# Patient Record
Sex: MALE | Race: BLACK OR AFRICAN AMERICAN | Employment: UNEMPLOYED | URBAN - METROPOLITAN AREA
[De-identification: names, ages, dates, MRNs, and addresses within clinical notes are randomized per-mention and may not be internally consistent; named-entity substitution may affect disease eponyms.]

---

## 2017-01-10 RX ORDER — OXYCODONE HYDROCHLORIDE AND ACETAMINOPHEN 5; 325 MG/1; MG/1
1 TABLET ORAL EVERY 6 HOURS PRN
Qty: 40 TABLET | Refills: 0 | Status: SHIPPED | OUTPATIENT
Start: 2017-01-10 | End: 2017-01-20

## 2017-01-20 ENCOUNTER — OFFICE VISIT (OUTPATIENT)
Dept: FAMILY MEDICINE CLINIC | Age: 57
End: 2017-01-20

## 2017-01-20 VITALS
SYSTOLIC BLOOD PRESSURE: 130 MMHG | DIASTOLIC BLOOD PRESSURE: 80 MMHG | HEART RATE: 82 BPM | BODY MASS INDEX: 33.09 KG/M2 | RESPIRATION RATE: 18 BRPM | WEIGHT: 223.4 LBS | TEMPERATURE: 98 F | HEIGHT: 69 IN

## 2017-01-20 DIAGNOSIS — F51.01 PRIMARY INSOMNIA: ICD-10-CM

## 2017-01-20 DIAGNOSIS — N52.9 ERECTILE DYSFUNCTION, UNSPECIFIED ERECTILE DYSFUNCTION TYPE: ICD-10-CM

## 2017-01-20 DIAGNOSIS — I10 ESSENTIAL HYPERTENSION: Primary | ICD-10-CM

## 2017-01-20 DIAGNOSIS — R07.89 OTHER CHEST PAIN: ICD-10-CM

## 2017-01-20 DIAGNOSIS — K40.20 BILATERAL INGUINAL HERNIA WITHOUT OBSTRUCTION OR GANGRENE, RECURRENCE NOT SPECIFIED: ICD-10-CM

## 2017-01-20 DIAGNOSIS — M51.9 LUMBAR DISC DISEASE: ICD-10-CM

## 2017-01-20 DIAGNOSIS — M54.50 ACUTE BILATERAL LOW BACK PAIN WITHOUT SCIATICA: ICD-10-CM

## 2017-01-20 PROCEDURE — 99214 OFFICE O/P EST MOD 30 MIN: CPT | Performed by: FAMILY MEDICINE

## 2017-01-20 RX ORDER — ZOLPIDEM TARTRATE 10 MG/1
10 TABLET ORAL NIGHTLY PRN
Qty: 30 TABLET | Refills: 0 | Status: SHIPPED | OUTPATIENT
Start: 2017-01-20 | End: 2017-06-29 | Stop reason: SDUPTHER

## 2017-01-20 RX ORDER — OXYCODONE AND ACETAMINOPHEN 7.5; 325 MG/1; MG/1
1 TABLET ORAL EVERY 8 HOURS PRN
Qty: 30 TABLET | Refills: 0 | Status: SHIPPED | OUTPATIENT
Start: 2017-01-20 | End: 2017-01-31 | Stop reason: SDUPTHER

## 2017-01-31 ENCOUNTER — OFFICE VISIT (OUTPATIENT)
Dept: FAMILY MEDICINE CLINIC | Age: 57
End: 2017-01-31

## 2017-01-31 VITALS
TEMPERATURE: 98.7 F | HEART RATE: 70 BPM | BODY MASS INDEX: 33.18 KG/M2 | WEIGHT: 224 LBS | SYSTOLIC BLOOD PRESSURE: 122 MMHG | HEIGHT: 69 IN | DIASTOLIC BLOOD PRESSURE: 80 MMHG | RESPIRATION RATE: 16 BRPM

## 2017-01-31 DIAGNOSIS — I10 ESSENTIAL HYPERTENSION: Primary | ICD-10-CM

## 2017-01-31 DIAGNOSIS — M51.9 LUMBAR DISC DISEASE: ICD-10-CM

## 2017-01-31 DIAGNOSIS — N52.9 ERECTILE DYSFUNCTION, UNSPECIFIED ERECTILE DYSFUNCTION TYPE: ICD-10-CM

## 2017-01-31 DIAGNOSIS — F51.01 PRIMARY INSOMNIA: ICD-10-CM

## 2017-01-31 DIAGNOSIS — K40.20 BILATERAL INGUINAL HERNIA WITHOUT OBSTRUCTION OR GANGRENE, RECURRENCE NOT SPECIFIED: ICD-10-CM

## 2017-01-31 PROCEDURE — 99214 OFFICE O/P EST MOD 30 MIN: CPT | Performed by: FAMILY MEDICINE

## 2017-01-31 PROCEDURE — 96372 THER/PROPH/DIAG INJ SC/IM: CPT | Performed by: FAMILY MEDICINE

## 2017-01-31 RX ORDER — OXYCODONE AND ACETAMINOPHEN 7.5; 325 MG/1; MG/1
1 TABLET ORAL EVERY 8 HOURS PRN
Qty: 25 TABLET | Refills: 0 | Status: SHIPPED | OUTPATIENT
Start: 2017-01-31 | End: 2017-02-14 | Stop reason: SDUPTHER

## 2017-01-31 RX ORDER — KETOROLAC TROMETHAMINE 30 MG/ML
60 INJECTION, SOLUTION INTRAMUSCULAR; INTRAVENOUS ONCE
Status: COMPLETED | OUTPATIENT
Start: 2017-01-31 | End: 2017-01-31

## 2017-01-31 RX ADMIN — KETOROLAC TROMETHAMINE 60 MG: 30 INJECTION, SOLUTION INTRAMUSCULAR; INTRAVENOUS at 11:40

## 2017-02-07 ENCOUNTER — TELEPHONE (OUTPATIENT)
Dept: FAMILY MEDICINE CLINIC | Age: 57
End: 2017-02-07

## 2017-02-10 RX ORDER — TADALAFIL 20 MG/1
20 TABLET ORAL PRN
Qty: 6 TABLET | Refills: 3 | Status: SHIPPED | OUTPATIENT
Start: 2017-02-10 | End: 2017-09-15 | Stop reason: ALTCHOICE

## 2017-02-14 ENCOUNTER — OFFICE VISIT (OUTPATIENT)
Dept: FAMILY MEDICINE CLINIC | Age: 57
End: 2017-02-14

## 2017-02-14 VITALS
TEMPERATURE: 98.9 F | WEIGHT: 232 LBS | DIASTOLIC BLOOD PRESSURE: 84 MMHG | HEART RATE: 90 BPM | SYSTOLIC BLOOD PRESSURE: 130 MMHG | BODY MASS INDEX: 34.36 KG/M2 | RESPIRATION RATE: 18 BRPM | HEIGHT: 69 IN

## 2017-02-14 DIAGNOSIS — F51.01 PRIMARY INSOMNIA: ICD-10-CM

## 2017-02-14 DIAGNOSIS — M51.9 LUMBAR DISC DISEASE: ICD-10-CM

## 2017-02-14 DIAGNOSIS — N52.9 ERECTILE DYSFUNCTION, UNSPECIFIED ERECTILE DYSFUNCTION TYPE: ICD-10-CM

## 2017-02-14 DIAGNOSIS — I10 ESSENTIAL HYPERTENSION: Primary | ICD-10-CM

## 2017-02-14 DIAGNOSIS — K40.20 BILATERAL INGUINAL HERNIA WITHOUT OBSTRUCTION OR GANGRENE, RECURRENCE NOT SPECIFIED: ICD-10-CM

## 2017-02-14 PROCEDURE — 99214 OFFICE O/P EST MOD 30 MIN: CPT | Performed by: FAMILY MEDICINE

## 2017-02-14 RX ORDER — OXYCODONE AND ACETAMINOPHEN 7.5; 325 MG/1; MG/1
1 TABLET ORAL EVERY 8 HOURS PRN
Qty: 60 TABLET | Refills: 0 | Status: SHIPPED | OUTPATIENT
Start: 2017-02-14 | End: 2017-06-29 | Stop reason: SDUPTHER

## 2017-02-14 RX ORDER — LOSARTAN POTASSIUM 50 MG/1
50 TABLET ORAL DAILY
Qty: 30 TABLET | Refills: 3 | Status: SHIPPED | OUTPATIENT
Start: 2017-02-14 | End: 2017-06-29 | Stop reason: SDUPTHER

## 2017-06-28 ENCOUNTER — TELEPHONE (OUTPATIENT)
Dept: FAMILY MEDICINE CLINIC | Age: 57
End: 2017-06-28

## 2017-06-29 ENCOUNTER — OFFICE VISIT (OUTPATIENT)
Dept: FAMILY MEDICINE CLINIC | Age: 57
End: 2017-06-29

## 2017-06-29 VITALS
BODY MASS INDEX: 35.99 KG/M2 | HEART RATE: 80 BPM | RESPIRATION RATE: 16 BRPM | HEIGHT: 69 IN | WEIGHT: 243 LBS | DIASTOLIC BLOOD PRESSURE: 80 MMHG | TEMPERATURE: 98.7 F | SYSTOLIC BLOOD PRESSURE: 120 MMHG

## 2017-06-29 DIAGNOSIS — Z01.818 PREOP EXAMINATION: Primary | ICD-10-CM

## 2017-06-29 DIAGNOSIS — M54.41 CHRONIC BILATERAL LOW BACK PAIN WITH RIGHT-SIDED SCIATICA: ICD-10-CM

## 2017-06-29 DIAGNOSIS — G89.29 CHRONIC BILATERAL LOW BACK PAIN WITH RIGHT-SIDED SCIATICA: ICD-10-CM

## 2017-06-29 PROCEDURE — 99244 OFF/OP CNSLTJ NEW/EST MOD 40: CPT | Performed by: FAMILY MEDICINE

## 2017-06-29 RX ORDER — KETOROLAC TROMETHAMINE 30 MG/ML
60 INJECTION, SOLUTION INTRAMUSCULAR; INTRAVENOUS ONCE
Status: COMPLETED | OUTPATIENT
Start: 2017-06-29 | End: 2017-06-29

## 2017-06-29 RX ORDER — OXYCODONE AND ACETAMINOPHEN 7.5; 325 MG/1; MG/1
1 TABLET ORAL EVERY 8 HOURS PRN
Qty: 60 TABLET | Refills: 0 | Status: SHIPPED | OUTPATIENT
Start: 2017-06-29 | End: 2017-07-17 | Stop reason: SDUPTHER

## 2017-06-29 RX ORDER — LOSARTAN POTASSIUM 50 MG/1
50 TABLET ORAL DAILY
Qty: 30 TABLET | Refills: 3 | Status: SHIPPED | OUTPATIENT
Start: 2017-06-29 | End: 2017-09-08

## 2017-06-29 RX ORDER — ZOLPIDEM TARTRATE 10 MG/1
10 TABLET ORAL NIGHTLY PRN
Qty: 30 TABLET | Refills: 0 | Status: SHIPPED | OUTPATIENT
Start: 2017-06-29 | End: 2017-07-17 | Stop reason: SDUPTHER

## 2017-06-29 RX ADMIN — KETOROLAC TROMETHAMINE 60 MG: 30 INJECTION, SOLUTION INTRAMUSCULAR; INTRAVENOUS at 11:13

## 2017-07-05 ENCOUNTER — TELEPHONE (OUTPATIENT)
Dept: FAMILY MEDICINE CLINIC | Age: 57
End: 2017-07-05

## 2017-07-14 ENCOUNTER — NURSE ONLY (OUTPATIENT)
Dept: FAMILY MEDICINE CLINIC | Age: 57
End: 2017-07-14

## 2017-07-14 ENCOUNTER — TELEPHONE (OUTPATIENT)
Dept: FAMILY MEDICINE CLINIC | Age: 57
End: 2017-07-14

## 2017-07-14 DIAGNOSIS — M79.606 PAIN OF LOWER EXTREMITY, UNSPECIFIED LATERALITY: Primary | ICD-10-CM

## 2017-07-14 PROCEDURE — 96372 THER/PROPH/DIAG INJ SC/IM: CPT | Performed by: FAMILY MEDICINE

## 2017-07-14 RX ORDER — KETOROLAC TROMETHAMINE 30 MG/ML
60 INJECTION, SOLUTION INTRAMUSCULAR; INTRAVENOUS ONCE
Status: COMPLETED | OUTPATIENT
Start: 2017-07-14 | End: 2017-07-14

## 2017-07-14 RX ADMIN — KETOROLAC TROMETHAMINE 60 MG: 30 INJECTION, SOLUTION INTRAMUSCULAR; INTRAVENOUS at 16:08

## 2017-07-17 ENCOUNTER — HOSPITAL ENCOUNTER (OUTPATIENT)
Dept: GENERAL RADIOLOGY | Age: 57
Discharge: HOME | End: 2017-07-17
Payer: MEDICAID

## 2017-07-17 DIAGNOSIS — M51.36 DEGENERATIVE DISC DISEASE, LUMBAR: ICD-10-CM

## 2017-07-17 DIAGNOSIS — M54.16 LUMBAR RADICULOPATHY: ICD-10-CM

## 2017-07-17 PROCEDURE — 72110 X-RAY EXAM L-2 SPINE 4/>VWS: CPT

## 2017-07-17 RX ORDER — ZOLPIDEM TARTRATE 10 MG/1
10 TABLET ORAL NIGHTLY PRN
Qty: 30 TABLET | Refills: 0 | Status: SHIPPED | OUTPATIENT
Start: 2017-07-17 | End: 2017-10-12 | Stop reason: SDUPTHER

## 2017-07-17 RX ORDER — OXYCODONE AND ACETAMINOPHEN 7.5; 325 MG/1; MG/1
1 TABLET ORAL EVERY 8 HOURS PRN
Qty: 40 TABLET | Refills: 0 | Status: SHIPPED | OUTPATIENT
Start: 2017-07-17 | End: 2017-07-31 | Stop reason: SDUPTHER

## 2017-07-31 ENCOUNTER — NURSE ONLY (OUTPATIENT)
Dept: FAMILY MEDICINE CLINIC | Age: 57
End: 2017-07-31

## 2017-07-31 DIAGNOSIS — M51.9 LUMBAR DISC DISEASE: Primary | ICD-10-CM

## 2017-07-31 PROCEDURE — 96372 THER/PROPH/DIAG INJ SC/IM: CPT | Performed by: FAMILY MEDICINE

## 2017-07-31 RX ORDER — KETOROLAC TROMETHAMINE 30 MG/ML
30 INJECTION, SOLUTION INTRAMUSCULAR; INTRAVENOUS ONCE
Status: COMPLETED | OUTPATIENT
Start: 2017-07-31 | End: 2017-07-31

## 2017-07-31 RX ORDER — OXYCODONE AND ACETAMINOPHEN 7.5; 325 MG/1; MG/1
1 TABLET ORAL EVERY 8 HOURS PRN
Qty: 40 TABLET | Refills: 0 | Status: SHIPPED | OUTPATIENT
Start: 2017-07-31 | End: 2017-08-12 | Stop reason: SDUPTHER

## 2017-07-31 RX ADMIN — KETOROLAC TROMETHAMINE 30 MG: 30 INJECTION, SOLUTION INTRAMUSCULAR; INTRAVENOUS at 11:55

## 2017-08-12 ENCOUNTER — OFFICE VISIT (OUTPATIENT)
Dept: FAMILY MEDICINE CLINIC | Age: 57
End: 2017-08-12

## 2017-08-12 VITALS
DIASTOLIC BLOOD PRESSURE: 88 MMHG | HEIGHT: 69 IN | OXYGEN SATURATION: 96 % | RESPIRATION RATE: 25 BRPM | SYSTOLIC BLOOD PRESSURE: 128 MMHG | HEART RATE: 104 BPM | TEMPERATURE: 98.7 F

## 2017-08-12 DIAGNOSIS — M54.41 CHRONIC RIGHT-SIDED LOW BACK PAIN WITH RIGHT-SIDED SCIATICA: Primary | ICD-10-CM

## 2017-08-12 DIAGNOSIS — G89.29 CHRONIC RIGHT-SIDED LOW BACK PAIN WITH RIGHT-SIDED SCIATICA: Primary | ICD-10-CM

## 2017-08-12 DIAGNOSIS — M51.9 LUMBAR DISC DISEASE: ICD-10-CM

## 2017-08-12 PROCEDURE — 96372 THER/PROPH/DIAG INJ SC/IM: CPT | Performed by: NURSE PRACTITIONER

## 2017-08-12 PROCEDURE — 99213 OFFICE O/P EST LOW 20 MIN: CPT | Performed by: NURSE PRACTITIONER

## 2017-08-12 RX ORDER — KETOROLAC TROMETHAMINE 30 MG/ML
60 INJECTION, SOLUTION INTRAMUSCULAR; INTRAVENOUS ONCE
Status: COMPLETED | OUTPATIENT
Start: 2017-08-12 | End: 2017-08-12

## 2017-08-12 RX ORDER — PREDNISONE 10 MG/1
TABLET ORAL
Qty: 42 TABLET | Refills: 0 | Status: SHIPPED | OUTPATIENT
Start: 2017-08-12 | End: 2017-09-08

## 2017-08-12 RX ORDER — OXYCODONE AND ACETAMINOPHEN 7.5; 325 MG/1; MG/1
1 TABLET ORAL EVERY 8 HOURS PRN
Qty: 9 TABLET | Refills: 0 | Status: SHIPPED | OUTPATIENT
Start: 2017-08-12 | End: 2017-08-15

## 2017-08-12 RX ADMIN — KETOROLAC TROMETHAMINE 60 MG: 30 INJECTION, SOLUTION INTRAMUSCULAR; INTRAVENOUS at 15:29

## 2017-08-12 ASSESSMENT — ENCOUNTER SYMPTOMS
BOWEL INCONTINENCE: 0
BACK PAIN: 1

## 2017-08-12 ASSESSMENT — PATIENT HEALTH QUESTIONNAIRE - PHQ9
2. FEELING DOWN, DEPRESSED OR HOPELESS: 0
SUM OF ALL RESPONSES TO PHQ QUESTIONS 1-9: 0
1. LITTLE INTEREST OR PLEASURE IN DOING THINGS: 0
SUM OF ALL RESPONSES TO PHQ9 QUESTIONS 1 & 2: 0

## 2017-08-15 ENCOUNTER — TELEPHONE (OUTPATIENT)
Dept: FAMILY MEDICINE CLINIC | Age: 57
End: 2017-08-15

## 2017-08-15 RX ORDER — DOCUSATE SODIUM 100 MG/1
100 CAPSULE, LIQUID FILLED ORAL DAILY PRN
Qty: 30 CAPSULE | Refills: 1 | Status: SHIPPED | OUTPATIENT
Start: 2017-08-15

## 2017-08-15 RX ORDER — OXYCODONE AND ACETAMINOPHEN 7.5; 325 MG/1; MG/1
1 TABLET ORAL EVERY 8 HOURS PRN
Qty: 30 TABLET | Refills: 0 | Status: SHIPPED | OUTPATIENT
Start: 2017-08-15 | End: 2017-08-25 | Stop reason: SDUPTHER

## 2017-08-25 ENCOUNTER — TELEPHONE (OUTPATIENT)
Dept: FAMILY MEDICINE CLINIC | Age: 57
End: 2017-08-25

## 2017-08-25 ENCOUNTER — NURSE ONLY (OUTPATIENT)
Dept: FAMILY MEDICINE CLINIC | Age: 57
End: 2017-08-25

## 2017-08-25 DIAGNOSIS — M54.31 SCIATIC PAIN, RIGHT: Primary | ICD-10-CM

## 2017-08-25 DIAGNOSIS — M51.9 LUMBAR DISC DISEASE: ICD-10-CM

## 2017-08-25 PROCEDURE — 96372 THER/PROPH/DIAG INJ SC/IM: CPT | Performed by: FAMILY MEDICINE

## 2017-08-25 RX ORDER — KETOROLAC TROMETHAMINE 30 MG/ML
60 INJECTION, SOLUTION INTRAMUSCULAR; INTRAVENOUS ONCE
Status: SHIPPED | OUTPATIENT
Start: 2017-08-25 | End: 2017-08-30

## 2017-08-25 RX ORDER — KETOROLAC TROMETHAMINE 30 MG/ML
60 INJECTION, SOLUTION INTRAMUSCULAR; INTRAVENOUS ONCE
Status: COMPLETED | OUTPATIENT
Start: 2017-08-25 | End: 2017-08-25

## 2017-08-25 RX ORDER — OXYCODONE AND ACETAMINOPHEN 7.5; 325 MG/1; MG/1
1 TABLET ORAL EVERY 8 HOURS PRN
Qty: 9 TABLET | Refills: 0 | Status: SHIPPED | OUTPATIENT
Start: 2017-08-25 | End: 2017-08-25 | Stop reason: SDUPTHER

## 2017-08-25 RX ADMIN — KETOROLAC TROMETHAMINE 60 MG: 30 INJECTION, SOLUTION INTRAMUSCULAR; INTRAVENOUS at 10:29

## 2017-08-28 RX ORDER — OXYCODONE AND ACETAMINOPHEN 7.5; 325 MG/1; MG/1
1 TABLET ORAL EVERY 8 HOURS PRN
Qty: 30 TABLET | Refills: 0 | Status: SHIPPED | OUTPATIENT
Start: 2017-08-28 | End: 2017-09-08 | Stop reason: SDUPTHER

## 2017-09-08 ENCOUNTER — OFFICE VISIT (OUTPATIENT)
Dept: FAMILY MEDICINE CLINIC | Age: 57
End: 2017-09-08

## 2017-09-08 VITALS
BODY MASS INDEX: 37.26 KG/M2 | HEIGHT: 69 IN | HEART RATE: 92 BPM | TEMPERATURE: 97.8 F | WEIGHT: 251.6 LBS | RESPIRATION RATE: 18 BRPM | SYSTOLIC BLOOD PRESSURE: 180 MMHG | DIASTOLIC BLOOD PRESSURE: 100 MMHG

## 2017-09-08 DIAGNOSIS — N52.9 ERECTILE DYSFUNCTION, UNSPECIFIED ERECTILE DYSFUNCTION TYPE: ICD-10-CM

## 2017-09-08 DIAGNOSIS — M51.9 LUMBAR DISC DISEASE: Primary | ICD-10-CM

## 2017-09-08 DIAGNOSIS — K40.20 BILATERAL INGUINAL HERNIA WITHOUT OBSTRUCTION OR GANGRENE, RECURRENCE NOT SPECIFIED: ICD-10-CM

## 2017-09-08 DIAGNOSIS — M51.9 LUMBAR DISC DISEASE: ICD-10-CM

## 2017-09-08 DIAGNOSIS — I10 ESSENTIAL HYPERTENSION: ICD-10-CM

## 2017-09-08 DIAGNOSIS — F51.01 PRIMARY INSOMNIA: ICD-10-CM

## 2017-09-08 DIAGNOSIS — R13.19 OTHER DYSPHAGIA: ICD-10-CM

## 2017-09-08 LAB
BILIRUBIN URINE: NEGATIVE
BLOOD, URINE: NEGATIVE
CLARITY: CLEAR
COLOR: YELLOW
GLUCOSE URINE: NEGATIVE MG/DL
KETONES, URINE: NEGATIVE MG/DL
LEUKOCYTE ESTERASE, URINE: NEGATIVE
NITRITE, URINE: NEGATIVE
PH UA: 7 (ref 5–9)
PROTEIN UA: ABNORMAL MG/DL
SPECIFIC GRAVITY UA: 1.02 (ref 1–1.03)
UROBILINOGEN, URINE: 1 E.U./DL

## 2017-09-08 PROCEDURE — 96372 THER/PROPH/DIAG INJ SC/IM: CPT | Performed by: FAMILY MEDICINE

## 2017-09-08 PROCEDURE — 93000 ELECTROCARDIOGRAM COMPLETE: CPT | Performed by: FAMILY MEDICINE

## 2017-09-08 PROCEDURE — 99214 OFFICE O/P EST MOD 30 MIN: CPT | Performed by: FAMILY MEDICINE

## 2017-09-08 RX ORDER — OXYCODONE AND ACETAMINOPHEN 7.5; 325 MG/1; MG/1
1 TABLET ORAL EVERY 8 HOURS PRN
Qty: 80 TABLET | Refills: 0 | Status: SHIPPED | OUTPATIENT
Start: 2017-09-08 | End: 2017-09-26 | Stop reason: SDUPTHER

## 2017-09-08 RX ORDER — INDAPAMIDE 1.25 MG/1
1.25 TABLET, FILM COATED ORAL EVERY MORNING
Qty: 90 TABLET | Refills: 3 | Status: SHIPPED | OUTPATIENT
Start: 2017-09-08

## 2017-09-08 RX ORDER — AMLODIPINE BESYLATE 5 MG/1
5 TABLET ORAL DAILY
Qty: 30 TABLET | Refills: 3 | Status: SHIPPED | OUTPATIENT
Start: 2017-09-08

## 2017-09-08 RX ORDER — KETOROLAC TROMETHAMINE 30 MG/ML
60 INJECTION, SOLUTION INTRAMUSCULAR; INTRAVENOUS ONCE
Status: COMPLETED | OUTPATIENT
Start: 2017-09-08 | End: 2017-09-08

## 2017-09-08 RX ORDER — INDAPAMIDE 1.25 MG/1
1.25 TABLET, FILM COATED ORAL EVERY MORNING
Qty: 90 TABLET | Refills: 3 | Status: SHIPPED | OUTPATIENT
Start: 2017-09-08 | End: 2017-09-08

## 2017-09-08 RX ADMIN — KETOROLAC TROMETHAMINE 60 MG: 30 INJECTION, SOLUTION INTRAMUSCULAR; INTRAVENOUS at 09:47

## 2017-09-15 ENCOUNTER — OFFICE VISIT (OUTPATIENT)
Dept: FAMILY MEDICINE CLINIC | Age: 57
End: 2017-09-15

## 2017-09-15 VITALS
BODY MASS INDEX: 37.68 KG/M2 | HEART RATE: 80 BPM | RESPIRATION RATE: 18 BRPM | HEIGHT: 69 IN | WEIGHT: 254.4 LBS | TEMPERATURE: 98.2 F | DIASTOLIC BLOOD PRESSURE: 88 MMHG | SYSTOLIC BLOOD PRESSURE: 132 MMHG

## 2017-09-15 DIAGNOSIS — I10 ESSENTIAL HYPERTENSION: ICD-10-CM

## 2017-09-15 DIAGNOSIS — M51.9 LUMBAR DISC DISEASE: ICD-10-CM

## 2017-09-15 DIAGNOSIS — Z01.818 PREOP GENERAL PHYSICAL EXAM: Primary | ICD-10-CM

## 2017-09-15 DIAGNOSIS — M54.40 ACUTE BILATERAL LOW BACK PAIN WITH SCIATICA, SCIATICA LATERALITY UNSPECIFIED: ICD-10-CM

## 2017-09-15 PROCEDURE — 99244 OFF/OP CNSLTJ NEW/EST MOD 40: CPT | Performed by: FAMILY MEDICINE

## 2017-09-18 ENCOUNTER — TELEPHONE (OUTPATIENT)
Dept: FAMILY MEDICINE CLINIC | Age: 57
End: 2017-09-18

## 2017-09-26 ENCOUNTER — TELEPHONE (OUTPATIENT)
Dept: FAMILY MEDICINE CLINIC | Age: 57
End: 2017-09-26

## 2017-09-26 DIAGNOSIS — M51.9 LUMBAR DISC DISEASE: ICD-10-CM

## 2017-09-26 LAB
ALBUMIN SERPL-MCNC: 4 G/DL (ref 3.9–4.9)
ALP BLD-CCNC: 90 U/L (ref 35–104)
ALT SERPL-CCNC: 22 U/L (ref 0–41)
ANION GAP SERPL CALCULATED.3IONS-SCNC: 21 MEQ/L (ref 7–13)
APTT: 29.3 SEC (ref 21.6–35.4)
AST SERPL-CCNC: 20 U/L (ref 0–40)
BASOPHILS ABSOLUTE: 0 K/UL (ref 0–0.2)
BASOPHILS RELATIVE PERCENT: 0.7 %
BILIRUB SERPL-MCNC: 0.4 MG/DL (ref 0–1.2)
BUN BLDV-MCNC: 10 MG/DL (ref 6–20)
CALCIUM SERPL-MCNC: 9.8 MG/DL (ref 8.6–10.2)
CHLORIDE BLD-SCNC: 101 MEQ/L (ref 98–107)
CO2: 21 MEQ/L (ref 22–29)
CREAT SERPL-MCNC: 1.01 MG/DL (ref 0.7–1.2)
EOSINOPHILS ABSOLUTE: 0 K/UL (ref 0–0.7)
EOSINOPHILS RELATIVE PERCENT: 0.4 %
GFR AFRICAN AMERICAN: >60
GFR NON-AFRICAN AMERICAN: >60
GLOBULIN: 3.1 G/DL (ref 2.3–3.5)
GLUCOSE BLD-MCNC: 103 MG/DL (ref 74–109)
HCT VFR BLD CALC: 49.2 % (ref 42–52)
HEMOGLOBIN: 15.8 G/DL (ref 14–18)
INR BLD: 1
LYMPHOCYTES ABSOLUTE: 1.5 K/UL (ref 1–4.8)
LYMPHOCYTES RELATIVE PERCENT: 25 %
MCH RBC QN AUTO: 29.1 PG (ref 27–31.3)
MCHC RBC AUTO-ENTMCNC: 32.1 % (ref 33–37)
MCV RBC AUTO: 90.7 FL (ref 80–100)
MONOCYTES ABSOLUTE: 0.6 K/UL (ref 0.2–0.8)
MONOCYTES RELATIVE PERCENT: 9.6 %
NEUTROPHILS ABSOLUTE: 3.8 K/UL (ref 1.4–6.5)
NEUTROPHILS RELATIVE PERCENT: 64.3 %
PDW BLD-RTO: 14.5 % (ref 11.5–14.5)
PLATELET # BLD: 244 K/UL (ref 130–400)
POTASSIUM SERPL-SCNC: 4.7 MEQ/L (ref 3.5–5.1)
PROTHROMBIN TIME: 10.2 SEC (ref 8.1–13.7)
RBC # BLD: 5.43 M/UL (ref 4.7–6.1)
SODIUM BLD-SCNC: 143 MEQ/L (ref 132–144)
TOTAL PROTEIN: 7.1 G/DL (ref 6.4–8.1)
WBC # BLD: 5.9 K/UL (ref 4.8–10.8)

## 2017-09-26 RX ORDER — OXYCODONE AND ACETAMINOPHEN 7.5; 325 MG/1; MG/1
1 TABLET ORAL EVERY 8 HOURS PRN
Qty: 30 TABLET | Refills: 0 | Status: SHIPPED | OUTPATIENT
Start: 2017-09-26

## 2017-10-12 ENCOUNTER — HOSPITAL ENCOUNTER (OUTPATIENT)
Dept: GENERAL RADIOLOGY | Age: 57
Discharge: HOME | End: 2017-10-12
Payer: MEDICAID

## 2017-10-12 DIAGNOSIS — M51.36 DDD (DEGENERATIVE DISC DISEASE), LUMBAR: ICD-10-CM

## 2017-10-12 PROCEDURE — 72100 X-RAY EXAM L-S SPINE 2/3 VWS: CPT

## 2017-10-14 RX ORDER — ZOLPIDEM TARTRATE 10 MG/1
10 TABLET ORAL NIGHTLY PRN
Qty: 30 TABLET | Refills: 0 | Status: SHIPPED | OUTPATIENT
Start: 2017-10-14 | End: 2018-10-14

## 2017-11-06 ENCOUNTER — OFFICE VISIT (OUTPATIENT)
Dept: FAMILY MEDICINE CLINIC | Age: 57
End: 2017-11-06

## 2017-11-06 VITALS
DIASTOLIC BLOOD PRESSURE: 82 MMHG | OXYGEN SATURATION: 98 % | WEIGHT: 285 LBS | BODY MASS INDEX: 42.21 KG/M2 | TEMPERATURE: 97.5 F | HEIGHT: 69 IN | RESPIRATION RATE: 16 BRPM | SYSTOLIC BLOOD PRESSURE: 134 MMHG | HEART RATE: 94 BPM

## 2017-11-06 DIAGNOSIS — F51.01 PRIMARY INSOMNIA: ICD-10-CM

## 2017-11-06 DIAGNOSIS — M51.9 LUMBAR DISC DISEASE: ICD-10-CM

## 2017-11-06 DIAGNOSIS — I10 ESSENTIAL HYPERTENSION: ICD-10-CM

## 2017-11-06 DIAGNOSIS — K40.21 BILATERAL RECURRENT INGUINAL HERNIA WITHOUT OBSTRUCTION OR GANGRENE: Primary | ICD-10-CM

## 2017-11-06 DIAGNOSIS — K40.20 BILATERAL INGUINAL HERNIA WITHOUT OBSTRUCTION OR GANGRENE, RECURRENCE NOT SPECIFIED: ICD-10-CM

## 2017-11-06 PROCEDURE — G8417 CALC BMI ABV UP PARAM F/U: HCPCS | Performed by: FAMILY MEDICINE

## 2017-11-06 PROCEDURE — G8484 FLU IMMUNIZE NO ADMIN: HCPCS | Performed by: FAMILY MEDICINE

## 2017-11-06 PROCEDURE — G8427 DOCREV CUR MEDS BY ELIG CLIN: HCPCS | Performed by: FAMILY MEDICINE

## 2017-11-06 PROCEDURE — 3017F COLORECTAL CA SCREEN DOC REV: CPT | Performed by: FAMILY MEDICINE

## 2017-11-06 PROCEDURE — 99214 OFFICE O/P EST MOD 30 MIN: CPT | Performed by: FAMILY MEDICINE

## 2017-11-06 PROCEDURE — 1036F TOBACCO NON-USER: CPT | Performed by: FAMILY MEDICINE

## 2017-11-06 RX ORDER — SILDENAFIL CITRATE 20 MG/1
20 TABLET ORAL DAILY
Qty: 20 TABLET | Refills: 3 | Status: SHIPPED | OUTPATIENT
Start: 2017-11-06

## 2017-11-06 RX ORDER — OXYCODONE HYDROCHLORIDE AND ACETAMINOPHEN 5; 325 MG/1; MG/1
1 TABLET ORAL EVERY 4 HOURS PRN
COMMUNITY

## 2017-11-06 RX ORDER — ZOLPIDEM TARTRATE 10 MG/1
10 TABLET ORAL NIGHTLY PRN
Qty: 30 TABLET | Refills: 1 | Status: SHIPPED | OUTPATIENT
Start: 2017-11-06 | End: 2018-11-06

## 2017-11-06 NOTE — PROGRESS NOTES
Chief Complaint   Patient presents with    Personal Problem   multiple issues    lbp has improved  Status post surgery  r side  r leg radiation  No trauma    Ed stable  Would like refill of prescription  on meds    htn with relatively good control  Tolerating medication No cp/sob    Patient presents for  exam.      Recommend stay current with general health maintenance    Has insomnia  Requests Ambien  This has worked well    Patient Active Problem List   Diagnosis    Essential hypertension    Bilateral inguinal hernia without obstruction or gangrene    Erectile dysfunction    Lumbar disc disease    Primary insomnia       has a current medication list which includes the following prescription(s): oxycodone-acetaminophen, sildenafil, zolpidem, zolpidem, amlodipine, indapamide, docusate sodium, and oxycodone-acetaminophen. Past Medical History:   Diagnosis Date    Hernia        Past Surgical History:   Procedure Laterality Date    WRIST SURGERY Left        family history includes Cancer in his father; Mental Illness in his mother. Social History     Social History    Marital status:      Spouse name: N/A    Number of children: N/A    Years of education: N/A     Occupational History    Not on file.      Social History Main Topics    Smoking status: Never Smoker    Smokeless tobacco: Never Used    Alcohol use No    Drug use: No    Sexual activity: Yes     Partners: Female     Other Topics Concern    Not on file     Social History Narrative    No narrative on file       Allergies   Allergen Reactions    Lisinopril Other (See Comments)     Coughing up blood       Review of Systems - General ROS: negative  Psychological ROS: negative  ENT ROS: negative  Hematological and Lymphatic ROS: negative  Endocrine ROS: negative  Respiratory ROS: no cough, shortness of breath, or wheezing  Cardiovascular ROS: no chest pain or dyspnea on exertion  Gastrointestinal ROS: no abdominal pain, change in bowel habits, or black or bloody stools  Genito-Urinary ROS: no dysuria, trouble voiding, or hematuria  Musculoskeletal ROS: pain  Neurological ROS: no TIA or stroke symptoms  Dermatological ROS: neg    Blood pressure 134/82, pulse 94, temperature 97.5 °F (36.4 °C), temperature source Temporal, resp. rate 16, height 5' 9\" (1.753 m), weight 285 lb (129.3 kg), SpO2 98 %. Physical Examination: General appearance - alert, well appearing, and in no distress  Mental status - alert, oriented to person, place, and time  Eyes - pupils equal and reactive, extraocular eye movements intact  Ears - bilateral TM's and external ear canals normal  Mouth - mucous membranes moist, pharynx normal without lesions  Neck - supple, no significant adenopathy  Lymphatics - no palpable lymphadenopathy, no hepatosplenomegaly  Chest - clear to auscultation, no wheezes, rales or rhonchi, symmetric air entry  Heart - normal rate, regular rhythm, normal S1, S2, no murmurs, rubs, clicks or gallops  Abdomen - soft, nontender, nondistended, no masses or organomegaly  Neurological - alert, oriented, normal speech, no focal findings or movement disorder noted  Musculoskeletal - no joint tenderness, deformity or swelling  Extremities - peripheral pulses normal, no pedal edema, no clubbing or cyanosis, neg slr  Skin - normal coloration and turgor, no rashes, no suspicious skin lesions noted;    Assessment:    1. Bilateral recurrent inguinal hernia without obstruction or gangrene  Ambulatory referral to General Surgery   2. Essential hypertension     3. Bilateral inguinal hernia without obstruction or gangrene, recurrence not specified     4. Lumbar disc disease     5.  Primary insomnia         Plan:    Orders Placed This Encounter   Procedures    Ambulatory referral to General Surgery     Referral Priority:   Routine     Referral Type:   Consult for Advice and Opinion     Referral Reason:   Specialty Services Required     Referred to Provider:

## 2017-11-10 ENCOUNTER — TELEPHONE (OUTPATIENT)
Dept: FAMILY MEDICINE CLINIC | Age: 57
End: 2017-11-10

## 2017-11-13 NOTE — TELEPHONE ENCOUNTER
Received PA Denial from [email or web address] the patients prescription of Sildenafil 20 mg tablet was denied for the following reasons:     Medication was denied due to condition, Male erectile dysfunction, being excluded under [facility] therefore medication cannot be approved under pharmacy benefits package. Patient aware.

## 2017-11-24 ENCOUNTER — TELEPHONE (OUTPATIENT)
Dept: FAMILY MEDICINE CLINIC | Age: 57
End: 2017-11-24

## 2023-01-24 ENCOUNTER — TELEPHONE (OUTPATIENT)
Dept: UROLOGY | Age: 63
End: 2023-01-24

## 2023-01-24 ENCOUNTER — OFFICE VISIT (OUTPATIENT)
Dept: PRIMARY CARE CLINIC | Age: 63
End: 2023-01-24
Payer: COMMERCIAL

## 2023-01-24 VITALS
BODY MASS INDEX: 32.29 KG/M2 | TEMPERATURE: 97.3 F | WEIGHT: 218 LBS | HEIGHT: 69 IN | HEART RATE: 65 BPM | SYSTOLIC BLOOD PRESSURE: 160 MMHG | DIASTOLIC BLOOD PRESSURE: 108 MMHG | OXYGEN SATURATION: 98 %

## 2023-01-24 DIAGNOSIS — M51.9 LUMBAR DISC DISEASE: ICD-10-CM

## 2023-01-24 DIAGNOSIS — N43.3 HYDROCELE, UNSPECIFIED HYDROCELE TYPE: ICD-10-CM

## 2023-01-24 DIAGNOSIS — I10 HYPERTENSION, UNSPECIFIED TYPE: ICD-10-CM

## 2023-01-24 DIAGNOSIS — Z00.00 HEALTHCARE MAINTENANCE: ICD-10-CM

## 2023-01-24 DIAGNOSIS — R30.9 PAIN WITH URINATION: Primary | ICD-10-CM

## 2023-01-24 LAB
BILIRUBIN, POC: ABNORMAL
BLOOD URINE, POC: ABNORMAL
CLARITY, POC: CLEAR
COLOR, POC: ABNORMAL
GLUCOSE URINE, POC: ABNORMAL
KETONES, POC: ABNORMAL
LEUKOCYTE EST, POC: ABNORMAL
NITRITE, POC: ABNORMAL
PH, POC: 6
PROTEIN, POC: ABNORMAL
SPECIFIC GRAVITY, POC: 1.02
UROBILINOGEN, POC: ABNORMAL

## 2023-01-24 PROCEDURE — G8417 CALC BMI ABV UP PARAM F/U: HCPCS | Performed by: FAMILY MEDICINE

## 2023-01-24 PROCEDURE — 3017F COLORECTAL CA SCREEN DOC REV: CPT | Performed by: FAMILY MEDICINE

## 2023-01-24 PROCEDURE — 3080F DIAST BP >= 90 MM HG: CPT | Performed by: FAMILY MEDICINE

## 2023-01-24 PROCEDURE — 3077F SYST BP >= 140 MM HG: CPT | Performed by: FAMILY MEDICINE

## 2023-01-24 PROCEDURE — 1036F TOBACCO NON-USER: CPT | Performed by: FAMILY MEDICINE

## 2023-01-24 PROCEDURE — G8484 FLU IMMUNIZE NO ADMIN: HCPCS | Performed by: FAMILY MEDICINE

## 2023-01-24 PROCEDURE — 99213 OFFICE O/P EST LOW 20 MIN: CPT | Performed by: FAMILY MEDICINE

## 2023-01-24 PROCEDURE — G8427 DOCREV CUR MEDS BY ELIG CLIN: HCPCS | Performed by: FAMILY MEDICINE

## 2023-01-24 PROCEDURE — 81003 URINALYSIS AUTO W/O SCOPE: CPT | Performed by: FAMILY MEDICINE

## 2023-01-24 RX ORDER — SILDENAFIL CITRATE 20 MG/1
20 TABLET ORAL DAILY
Qty: 20 TABLET | Refills: 3 | Status: CANCELLED | OUTPATIENT
Start: 2023-01-24

## 2023-01-24 RX ORDER — AMLODIPINE BESYLATE 5 MG/1
5 TABLET ORAL DAILY
Qty: 30 TABLET | Refills: 3 | Status: SHIPPED | OUTPATIENT
Start: 2023-01-24

## 2023-01-24 SDOH — ECONOMIC STABILITY: FOOD INSECURITY: WITHIN THE PAST 12 MONTHS, YOU WORRIED THAT YOUR FOOD WOULD RUN OUT BEFORE YOU GOT MONEY TO BUY MORE.: NEVER TRUE

## 2023-01-24 SDOH — ECONOMIC STABILITY: FOOD INSECURITY: WITHIN THE PAST 12 MONTHS, THE FOOD YOU BOUGHT JUST DIDN'T LAST AND YOU DIDN'T HAVE MONEY TO GET MORE.: NEVER TRUE

## 2023-01-24 ASSESSMENT — ENCOUNTER SYMPTOMS
ABDOMINAL PAIN: 1
DIARRHEA: 1
BACK PAIN: 1
RESPIRATORY NEGATIVE: 1
VOMITING: 0
EYES NEGATIVE: 1
BLOOD IN STOOL: 0
ANAL BLEEDING: 0

## 2023-01-24 ASSESSMENT — PATIENT HEALTH QUESTIONNAIRE - PHQ9
SUM OF ALL RESPONSES TO PHQ QUESTIONS 1-9: 0
SUM OF ALL RESPONSES TO PHQ QUESTIONS 1-9: 0
SUM OF ALL RESPONSES TO PHQ9 QUESTIONS 1 & 2: 0
2. FEELING DOWN, DEPRESSED OR HOPELESS: 0
SUM OF ALL RESPONSES TO PHQ QUESTIONS 1-9: 0
SUM OF ALL RESPONSES TO PHQ QUESTIONS 1-9: 0
1. LITTLE INTEREST OR PLEASURE IN DOING THINGS: 0

## 2023-01-24 ASSESSMENT — SOCIAL DETERMINANTS OF HEALTH (SDOH): HOW HARD IS IT FOR YOU TO PAY FOR THE VERY BASICS LIKE FOOD, HOUSING, MEDICAL CARE, AND HEATING?: NOT VERY HARD

## 2023-01-24 NOTE — TELEPHONE ENCOUNTER
Dr. Cruz Zoey called to see if you would be willing to review this patients records from [facility] and consider seeing him. Pt is addiment that he no longer wants to see [facility] urology.  He needs seen for scrotal swelling and pain/Hydrocele

## 2023-01-24 NOTE — PROGRESS NOTES
Subjective:      Patient ID: Ganesh Werner is a 65 y.o. male who presents today for:  Chief Complaint   Patient presents with    Establish Care    Urinary Pain     Patient stated there is bleeding at times in urine     Testicle Pain       HPI presents for establishment of care. He also complains of pain and swelling in his right testicle. He was seen last month in the emergency room and by [facility] urology for large hydrocele. He was planned to have right hydrocelectomy on , however patient had to be in [place] for a  and missed appointment. He wishes to have a more local urologist.  Testicular pain and swelling have worsened over the past week or 2. He complains of penile discharge and occasional lower abdominal pain. He urinates up to 20 times per night per patient. He occasionally sees blood in his urine. Frequent diarrhea. No chest pain or difficulty breathing. No dizziness or diarrhea. No neurological change. Occasional mild headache. Patient has not been taking his blood pressure medications over the past month. He feels his blood pressure is elevated due to the significant pain he feels in his testicle.     Past Medical History:   Diagnosis Date    Hernia      Past Surgical History:   Procedure Laterality Date    WRIST SURGERY Left      Social History     Socioeconomic History    Marital status:      Spouse name: Not on file    Number of children: Not on file    Years of education: Not on file    Highest education level: Not on file   Occupational History    Not on file   Tobacco Use    Smoking status: Never    Smokeless tobacco: Never   Substance and Sexual Activity    Alcohol use: No    Drug use: No    Sexual activity: Yes     Partners: Female   Other Topics Concern    Not on file   Social History Narrative    Not on file     Social Determinants of Health     Financial Resource Strain: Low Risk     Difficulty of Paying Living Expenses: Not very hard   Food Insecurity: No Food Insecurity    Worried About Running Out of Food in the Last Year: Never true    Ran Out of Food in the Last Year: Never true   Transportation Needs: Not on file   Physical Activity: Not on file   Stress: Not on file   Social Connections: Not on file   Intimate Partner Violence: Not on file   Housing Stability: Not on file     Family History   Problem Relation Age of Onset    Mental Illness Mother     Cancer Father      Allergies   Allergen Reactions    Lisinopril Other (See Comments)     Coughing up blood    Tramadol Hives         Review of Systems   Constitutional: Negative. Negative for fever. HENT: Negative. Eyes: Negative. Respiratory: Negative. Cardiovascular: Negative. Gastrointestinal:  Positive for abdominal pain and diarrhea. Negative for anal bleeding, blood in stool and vomiting. Genitourinary:  Positive for dysuria, frequency, hematuria, penile discharge, scrotal swelling, testicular pain and urgency. Negative for flank pain and genital sores. Musculoskeletal:  Positive for back pain (chronic). Skin: Negative. Neurological:  Negative for dizziness, tremors, syncope, facial asymmetry, speech difficulty and weakness. Psychiatric/Behavioral: Negative. Objective:   BP (!) 160/108 (Site: Right Upper Arm, Position: Sitting)   Pulse 65   Temp 97.3 °F (36.3 °C) (Temporal)   Ht 5' 9\" (1.753 m)   Wt 218 lb (98.9 kg)   SpO2 98%   BMI 32.19 kg/m²     Physical Exam  Vitals reviewed. Constitutional:       General: He is not in acute distress. Appearance: Normal appearance. HENT:      Head: Normocephalic. Mouth/Throat:      Mouth: Mucous membranes are moist.      Pharynx: Oropharynx is clear. No oropharyngeal exudate or posterior oropharyngeal erythema. Eyes:      Extraocular Movements: Extraocular movements intact. Conjunctiva/sclera: Conjunctivae normal.      Pupils: Pupils are equal, round, and reactive to light.    Cardiovascular: Rate and Rhythm: Normal rate and regular rhythm. Heart sounds: Normal heart sounds. Pulmonary:      Effort: Pulmonary effort is normal. No respiratory distress. Breath sounds: Normal breath sounds. No wheezing. Abdominal:      General: Abdomen is flat. Bowel sounds are normal. There is no distension. Palpations: Abdomen is soft. There is no mass. Tenderness: There is no abdominal tenderness. There is no guarding. Genitourinary:     Comments: Right testicle is severely enlarged and swollen, it is firm and tender. Penile exam is normal, no discharge noted or sores. Musculoskeletal:         General: Normal range of motion. Cervical back: Normal range of motion and neck supple. Right lower leg: No edema. Left lower leg: No edema. Lymphadenopathy:      Cervical: No cervical adenopathy. Skin:     General: Skin is warm and dry. Findings: No rash. Neurological:      General: No focal deficit present. Mental Status: He is alert and oriented to person, place, and time. Cranial Nerves: No cranial nerve deficit. Sensory: No sensory deficit. Motor: No weakness. Psychiatric:         Mood and Affect: Mood normal.         Behavior: Behavior normal.         Thought Content: Thought content normal.         Judgment: Judgment normal.       Assessment:       Diagnosis Orders   1. Pain with urination  POCT Urinalysis No Micro (Auto)    Comprehensive Metabolic Panel    CBC with Auto Differential    PSA Screening    Ambulatory referral to Urology      2. Lumbar disc disease  diclofenac (VOLTAREN) 50 MG EC tablet      3. Hypertension, unspecified type  amLODIPine (NORVASC) 5 MG tablet    Comprehensive Metabolic Panel    CBC with Auto Differential    TSH with Reflex      4. Healthcare maintenance  PSA Screening    HIV Screen    Hepatitis C Antibody      5.  Hydrocele, unspecified hydrocele type  POCT Urinalysis No Micro (Auto)    PSA Screening    diclofenac (VOLTAREN) 50 MG EC tablet    Ambulatory referral to Urology            Plan:      Orders Placed This Encounter   Procedures    Comprehensive Metabolic Panel     Standing Status:   Future     Standing Expiration Date:   1/24/2024    CBC with Auto Differential     Standing Status:   Future     Standing Expiration Date:   1/24/2024    PSA Screening     Standing Status:   Future     Standing Expiration Date:   1/24/2024    TSH with Reflex     Standing Status:   Future     Standing Expiration Date:   1/24/2024    HIV Screen     Standing Status:   Future     Standing Expiration Date:   1/24/2024    Hepatitis C Antibody     Standing Status:   Future     Standing Expiration Date:   1/24/2024    Ambulatory referral to Urology     Referral Priority:   Urgent     Referral Type:   Eval and Treat     Referral Reason:   Specialty Services Required     Referred to Provider:   Dona Rincon MD     Requested Specialty:   Urology     Number of Visits Requested:   1    POCT Urinalysis No Micro (Auto)     Orders Placed This Encounter   Medications    amLODIPine (NORVASC) 5 MG tablet     Sig: Take 1 tablet by mouth daily     Dispense:  30 tablet     Refill:  3    diclofenac (VOLTAREN) 50 MG EC tablet     Sig: Take 1 tablet by mouth 3 times daily (with meals) As needed for pain     Dispense:  60 tablet     Refill:  0       Return in about 1 week (around 1/31/2023). Nursing staff called Dr. Cynthia Ramos office to see if we can have patient seen today. We are waiting to hear back from them- they are going to review his records. I advised patient to go to the emergency room due to size of hydrocele, amount of pain, and elevated blood pressure with mild headache. I discussed risk of life threatening events like heart attack and stroke with significantly high blood pressure. Also discussed importance of being evaluated soon as possible for testicular swelling and pain.   Patient is resistant to going to the ER, feels blood pressure is elevated due to pain and prefers to see urology today if possible. Encouraged him to take his blood pressure medication as directed.           Ernesto Dumont MD

## 2023-04-03 PROBLEM — K21.9 GERD (GASTROESOPHAGEAL REFLUX DISEASE): Status: ACTIVE | Noted: 2023-04-03

## 2023-04-03 PROBLEM — R04.2 HEMOPTYSIS: Status: ACTIVE | Noted: 2023-04-03

## 2023-04-03 PROBLEM — K40.91 INGUINAL HERNIA, RECURRENT: Status: ACTIVE | Noted: 2023-04-03

## 2023-04-03 PROBLEM — N43.3 HYDROCELE IN ADULT: Status: ACTIVE | Noted: 2023-04-03

## 2023-04-04 ENCOUNTER — OFFICE VISIT (OUTPATIENT)
Dept: PRIMARY CARE | Facility: CLINIC | Age: 63
End: 2023-04-04
Payer: COMMERCIAL

## 2023-04-04 VITALS
SYSTOLIC BLOOD PRESSURE: 110 MMHG | HEART RATE: 67 BPM | DIASTOLIC BLOOD PRESSURE: 70 MMHG | TEMPERATURE: 96.9 F | HEIGHT: 68 IN | WEIGHT: 226 LBS | BODY MASS INDEX: 34.25 KG/M2

## 2023-04-04 DIAGNOSIS — M51.36 LUMBAR DEGENERATIVE DISC DISEASE: ICD-10-CM

## 2023-04-04 DIAGNOSIS — F51.01 PRIMARY INSOMNIA: Primary | ICD-10-CM

## 2023-04-04 DIAGNOSIS — I10 ESSENTIAL HYPERTENSION: Chronic | ICD-10-CM

## 2023-04-04 DIAGNOSIS — Z12.11 SCREENING FOR COLON CANCER: ICD-10-CM

## 2023-04-04 DIAGNOSIS — N43.3 HYDROCELE IN ADULT: ICD-10-CM

## 2023-04-04 DIAGNOSIS — N52.9 ERECTILE DISORDER: ICD-10-CM

## 2023-04-04 PROCEDURE — 99204 OFFICE O/P NEW MOD 45 MIN: CPT | Performed by: INTERNAL MEDICINE

## 2023-04-04 PROCEDURE — 3078F DIAST BP <80 MM HG: CPT | Performed by: INTERNAL MEDICINE

## 2023-04-04 PROCEDURE — 3074F SYST BP LT 130 MM HG: CPT | Performed by: INTERNAL MEDICINE

## 2023-04-04 PROCEDURE — 4004F PT TOBACCO SCREEN RCVD TLK: CPT | Performed by: INTERNAL MEDICINE

## 2023-04-04 RX ORDER — AMLODIPINE BESYLATE 5 MG/1
5 TABLET ORAL
COMMUNITY
Start: 2020-08-31 | End: 2023-06-30 | Stop reason: SDUPTHER

## 2023-04-04 RX ORDER — TRAZODONE HYDROCHLORIDE 50 MG/1
50 TABLET ORAL NIGHTLY PRN
Qty: 30 TABLET | Refills: 0 | Status: SHIPPED | OUTPATIENT
Start: 2023-04-04 | End: 2024-04-03

## 2023-04-04 RX ORDER — SILDENAFIL 100 MG/1
100 TABLET, FILM COATED ORAL AS NEEDED
Qty: 12 TABLET | Refills: 2 | Status: SHIPPED | OUTPATIENT
Start: 2023-04-04 | End: 2023-06-30 | Stop reason: SDUPTHER

## 2023-04-04 ASSESSMENT — ENCOUNTER SYMPTOMS
DIZZINESS: 0
SLEEP DISTURBANCE: 1
BLURRED VISION: 0
RESPIRATORY NEGATIVE: 1
CONSTITUTIONAL NEGATIVE: 1
ARTHRALGIAS: 1
CARDIOVASCULAR NEGATIVE: 1
BACK PAIN: 1
EYES NEGATIVE: 1
INSOMNIA: 1
NEUROLOGICAL NEGATIVE: 1
GASTROINTESTINAL NEGATIVE: 1
NERVOUS/ANXIOUS: 0
HYPERTENSION: 1

## 2023-04-04 NOTE — PROGRESS NOTES
Subjective   Patient ID: Jamaal Naqvi is a 62 y.o. male who presents for New Patient Visit (Control meds HBP).    62-year-old male patient came here as a new patient, he has not seen MD lately, there are some records in the electronic health record system which says that patient used to be on amlodipine, lisinopril, opioids, he has been consuming marijuana on a daily basis more or less he says, previous urine toxicology has been showing barbiturates and marijuana, he came here because he wants something to sleep, he has a chronic insomnia.  He also has been having erectile difficulty, he has not have any cardiac ailments, he showed me the bottle of amlodipine, he says that he has a very on restorative sleep for several years.  I see that he used to be on Lexapro therapy, he does not manifest any depression anxiety, he is on disability, he came with his wife who was accompanying him in the room.  He has a back surgery in the past, try to review information from electronic health records, he has not have any colon cancer screening.    Hypertension  This is a chronic problem. The current episode started more than 1 year ago. The problem has been waxing and waning since onset. The problem is controlled. Pertinent negatives include no anxiety, blurred vision, chest pain or malaise/fatigue. There are no associated agents to hypertension. Risk factors for coronary artery disease include obesity, male gender and stress. Past treatments include calcium channel blockers. The current treatment provides moderate improvement. There is no history of angina. There is no history of chronic renal disease.   Erectile Dysfunction  This is a chronic problem. The current episode started more than 1 year ago. The problem is unchanged. He reports no anxiety. Obstructive symptoms do not include dribbling. Nothing aggravates the symptoms. The treatment provided no relief. He has had no adverse reactions caused by medications. Risk factors  "include hypertension.   Insomnia  This is a chronic problem. The current episode started more than 1 year ago. The problem occurs constantly. Associated symptoms include arthralgias. Pertinent negatives include no chest pain. Nothing aggravates the symptoms. The treatment provided no relief.        Review of Systems   Constitutional: Negative.  Negative for malaise/fatigue.   HENT: Negative.     Eyes: Negative.  Negative for blurred vision.   Respiratory: Negative.     Cardiovascular: Negative.  Negative for chest pain.   Gastrointestinal: Negative.    Genitourinary:         Erectile difficulty   Musculoskeletal:  Positive for arthralgias and back pain.   Skin: Negative.    Neurological: Negative.  Negative for dizziness.   Psychiatric/Behavioral:  Positive for sleep disturbance. The patient has insomnia. The patient is not nervous/anxious.        Objective   /70 (BP Location: Right arm, Patient Position: Sitting, BP Cuff Size: Adult)   Pulse 67   Temp 36.1 °C (96.9 °F) (Temporal)   Ht 1.727 m (5' 8\")   Wt 103 kg (226 lb)   BMI 34.36 kg/m²     Physical Exam  Constitutional:       Appearance: Normal appearance. He is obese.   HENT:      Head: Normocephalic.      Comments: Mallampati score 3  Eyes:      Conjunctiva/sclera: Conjunctivae normal.   Cardiovascular:      Rate and Rhythm: Normal rate and regular rhythm.      Pulses: Normal pulses.      Heart sounds: Normal heart sounds.   Pulmonary:      Effort: Pulmonary effort is normal.      Breath sounds: Normal breath sounds.   Abdominal:      General: Abdomen is flat.      Palpations: Abdomen is soft.   Genitourinary:     Testes:         Right: Testicular hydrocele present.         Left: Testicular hydrocele present.   Musculoskeletal:         General: Normal range of motion.      Cervical back: Normal range of motion and neck supple.   Skin:     General: Skin is warm and dry.   Neurological:      General: No focal deficit present.      Mental Status: He is " oriented to person, place, and time. Mental status is at baseline.   Psychiatric:         Mood and Affect: Mood normal.         Judgment: Judgment normal.       Assessment/Plan   Problem List Items Addressed This Visit          Nervous    Primary insomnia - Primary       Circulatory    Essential hypertension (Chronic)       Genitourinary    Hydrocele in adult    Erectile disorder       Musculoskeletal    Lumbar degenerative disc disease   Patient was assessed, we will do several laboratories including PSA, amlodipine to be continued, he was normotensive.  Also we suggested him to have a screening colonoscopy, coronary calcium score.  Also he talk about insomnia so we talk about nonpharmacological measures to sleep better, he says he has tried everything, he just cannot stop worrying about things he says, we would prefer trazodone like therapy rather than zolpidem like therapy I told him.  All sleep aids have a side effects of prolonged dizziness and somnolence.  There are side effects of somnambulism, he has a huge hydrocele, urologist has been draining hydrocele but it keeps on coming, this hydrocele needs to be treated with a surgical excision of whole sac, alternate urology evaluation was asked, current urologist is not treating this hydrocele to cure it completely, inguinal hernias are asymptomatic, amlodipine will be continued, therapeutic lifestyle changes were reviewed and discussed, no extra medications besides sildenafil was given for erectile difficulty he says, lipid profile, correct calcium score scan, cardiovascular health will be assessed, follow-up in 3 months.

## 2023-05-05 DIAGNOSIS — I10 HYPERTENSION, UNSPECIFIED TYPE: ICD-10-CM

## 2023-05-05 RX ORDER — AMLODIPINE BESYLATE 5 MG/1
5 TABLET ORAL DAILY
Qty: 30 TABLET | Refills: 3 | Status: SHIPPED | OUTPATIENT
Start: 2023-05-05

## 2023-05-05 NOTE — TELEPHONE ENCOUNTER
Rx request   Requested Prescriptions     Pending Prescriptions Disp Refills    amLODIPine (NORVASC) 5 MG tablet 30 tablet 3     Sig: Take 1 tablet by mouth daily     LOV 1/24/2023  Next Visit Date:  No future appointments.

## 2023-06-30 DIAGNOSIS — I10 ESSENTIAL HYPERTENSION: Chronic | ICD-10-CM

## 2023-06-30 DIAGNOSIS — N52.9 ERECTILE DISORDER: ICD-10-CM

## 2023-06-30 RX ORDER — SILDENAFIL 100 MG/1
100 TABLET, FILM COATED ORAL AS NEEDED
Qty: 12 TABLET | Refills: 2 | Status: SHIPPED | OUTPATIENT
Start: 2023-06-30 | End: 2023-10-11

## 2023-06-30 RX ORDER — AMLODIPINE BESYLATE 5 MG/1
5 TABLET ORAL
Qty: 30 TABLET | Refills: 3 | Status: SHIPPED | OUTPATIENT
Start: 2023-06-30 | End: 2023-08-14 | Stop reason: SDUPTHER

## 2023-08-14 DIAGNOSIS — I10 ESSENTIAL HYPERTENSION: Chronic | ICD-10-CM

## 2023-08-14 RX ORDER — AMLODIPINE BESYLATE 5 MG/1
5 TABLET ORAL
Qty: 12 TABLET | Refills: 0 | Status: SHIPPED | OUTPATIENT
Start: 2023-08-14 | End: 2023-10-11

## 2023-10-11 DIAGNOSIS — N52.9 ERECTILE DISORDER: ICD-10-CM

## 2023-10-11 DIAGNOSIS — I10 ESSENTIAL HYPERTENSION: Chronic | ICD-10-CM

## 2023-10-11 RX ORDER — SILDENAFIL 100 MG/1
100 TABLET, FILM COATED ORAL AS NEEDED
Qty: 12 TABLET | Refills: 0 | Status: SHIPPED | OUTPATIENT
Start: 2023-10-11 | End: 2024-10-10

## 2023-10-11 RX ORDER — AMLODIPINE BESYLATE 5 MG/1
5 TABLET ORAL
Qty: 14 TABLET | Refills: 0 | Status: SHIPPED | OUTPATIENT
Start: 2023-10-11

## 2023-10-11 RX ORDER — AMLODIPINE BESYLATE 5 MG/1
5 TABLET ORAL
Qty: 12 TABLET | Refills: 2 | Status: SHIPPED | OUTPATIENT
Start: 2023-10-11 | End: 2023-10-11 | Stop reason: SDUPTHER

## 2023-10-25 ENCOUNTER — APPOINTMENT (OUTPATIENT)
Dept: PRIMARY CARE | Facility: CLINIC | Age: 63
End: 2023-10-25
Payer: COMMERCIAL

## 2023-12-06 DIAGNOSIS — I10 ESSENTIAL HYPERTENSION: Chronic | ICD-10-CM

## 2023-12-06 RX ORDER — AMLODIPINE BESYLATE 5 MG/1
5 TABLET ORAL
Qty: 14 TABLET | Refills: 0 | OUTPATIENT
Start: 2023-12-06

## 2024-04-18 ENCOUNTER — APPOINTMENT (OUTPATIENT)
Dept: GENERAL RADIOLOGY | Age: 64
End: 2024-04-18
Payer: COMMERCIAL

## 2024-04-18 ENCOUNTER — HOSPITAL ENCOUNTER (EMERGENCY)
Age: 64
Discharge: HOME | End: 2024-04-18
Payer: COMMERCIAL

## 2024-04-18 VITALS
WEIGHT: 218 LBS | RESPIRATION RATE: 17 BRPM | DIASTOLIC BLOOD PRESSURE: 117 MMHG | TEMPERATURE: 98 F | HEIGHT: 69 IN | HEART RATE: 80 BPM | OXYGEN SATURATION: 97 % | BODY MASS INDEX: 32.29 KG/M2 | SYSTOLIC BLOOD PRESSURE: 158 MMHG

## 2024-04-18 DIAGNOSIS — F41.1 ANXIETY STATE: ICD-10-CM

## 2024-04-18 DIAGNOSIS — R07.89 ATYPICAL CHEST PAIN: Primary | ICD-10-CM

## 2024-04-18 LAB
ALBUMIN SERPL-MCNC: 4 G/DL (ref 3.5–4.6)
ALP SERPL-CCNC: 96 U/L (ref 35–104)
ALT SERPL-CCNC: 18 U/L (ref 0–41)
ANION GAP SERPL CALCULATED.3IONS-SCNC: 10 MEQ/L (ref 9–15)
APTT PPP: 27.9 SEC (ref 24.4–36.8)
AST SERPL-CCNC: 21 U/L (ref 0–40)
BASOPHILS # BLD: 0 K/UL (ref 0–0.1)
BASOPHILS NFR BLD: 0.6 % (ref 0.2–1.2)
BILIRUB SERPL-MCNC: 0.5 MG/DL (ref 0.2–0.7)
BUN SERPL-MCNC: 19 MG/DL (ref 8–23)
CALCIUM SERPL-MCNC: 9.6 MG/DL (ref 8.5–9.9)
CHLORIDE SERPL-SCNC: 104 MEQ/L (ref 95–107)
CO2 SERPL-SCNC: 23 MEQ/L (ref 20–31)
CREAT SERPL-MCNC: 1.01 MG/DL (ref 0.7–1.2)
EOSINOPHIL # BLD: 0 K/UL (ref 0–0.5)
EOSINOPHIL NFR BLD: 0.6 % (ref 0.8–7)
ERYTHROCYTE [DISTWIDTH] IN BLOOD BY AUTOMATED COUNT: 12.6 % (ref 11.6–14.4)
ETHANOL PERCENT: NORMAL G/DL
ETHANOLAMINE SERPL-MCNC: <10 MG/DL (ref 0–0.08)
GLOBULIN SER CALC-MCNC: 3.2 G/DL (ref 2.3–3.5)
GLUCOSE SERPL-MCNC: 113 MG/DL (ref 70–99)
HCT VFR BLD AUTO: 48 % (ref 42–52)
HGB BLD-MCNC: 15.8 G/DL (ref 13.7–17.5)
IMM GRANULOCYTES # BLD: 0 K/UL
IMM GRANULOCYTES NFR BLD: 0.2 %
INR PPP: 1
LYMPHOCYTES # BLD: 1.6 K/UL (ref 1.3–3.6)
LYMPHOCYTES NFR BLD: 31.2 %
MAGNESIUM SERPL-MCNC: 2.1 MG/DL (ref 1.7–2.4)
MCH RBC QN AUTO: 30 PG (ref 25.7–32.2)
MCHC RBC AUTO-ENTMCNC: 32.9 % (ref 32.3–36.5)
MCV RBC AUTO: 91.1 FL (ref 79–92.2)
MONOCYTES # BLD: 0.6 K/UL (ref 0.3–0.8)
MONOCYTES NFR BLD: 11.7 % (ref 5.3–12.2)
NEUTROPHILS # BLD: 2.9 K/UL (ref 1.8–5.4)
NEUTS SEG NFR BLD: 55.7 % (ref 34–67.9)
PLATELET # BLD AUTO: 189 K/UL (ref 163–337)
POTASSIUM SERPL-SCNC: 4.2 MEQ/L (ref 3.4–4.9)
PROT SERPL-MCNC: 7.2 G/DL (ref 6.3–8)
PROTHROMBIN TIME: 13.8 SEC (ref 12.3–14.9)
RBC # BLD AUTO: 5.27 M/UL (ref 4.63–6.08)
SODIUM SERPL-SCNC: 137 MEQ/L (ref 135–144)
TROPONIN, HIGH SENSITIVITY: 8 NG/L (ref 0–19)
TROPONIN, HIGH SENSITIVITY: <6 NG/L (ref 0–19)
TSH REFLEX: 0.89 UIU/ML (ref 0.44–3.86)
WBC # BLD AUTO: 5.2 K/UL (ref 4.2–9)

## 2024-04-18 PROCEDURE — 84484 ASSAY OF TROPONIN QUANT: CPT

## 2024-04-18 PROCEDURE — 85610 PROTHROMBIN TIME: CPT

## 2024-04-18 PROCEDURE — 71045 X-RAY EXAM CHEST 1 VIEW: CPT

## 2024-04-18 PROCEDURE — 99285 EMERGENCY DEPT VISIT HI MDM: CPT

## 2024-04-18 PROCEDURE — 85730 THROMBOPLASTIN TIME PARTIAL: CPT

## 2024-04-18 PROCEDURE — 2580000003 HC RX 258

## 2024-04-18 PROCEDURE — 84443 ASSAY THYROID STIM HORMONE: CPT

## 2024-04-18 PROCEDURE — 6370000000 HC RX 637 (ALT 250 FOR IP)

## 2024-04-18 PROCEDURE — 82077 ASSAY SPEC XCP UR&BREATH IA: CPT

## 2024-04-18 PROCEDURE — 80053 COMPREHEN METABOLIC PANEL: CPT

## 2024-04-18 PROCEDURE — 93005 ELECTROCARDIOGRAM TRACING: CPT

## 2024-04-18 PROCEDURE — 36415 COLL VENOUS BLD VENIPUNCTURE: CPT

## 2024-04-18 PROCEDURE — 85025 COMPLETE CBC W/AUTO DIFF WBC: CPT

## 2024-04-18 PROCEDURE — 83735 ASSAY OF MAGNESIUM: CPT

## 2024-04-18 RX ORDER — ASPIRIN 81 MG/1
324 TABLET, CHEWABLE ORAL ONCE
Status: COMPLETED | OUTPATIENT
Start: 2024-04-18 | End: 2024-04-18

## 2024-04-18 RX ORDER — 0.9 % SODIUM CHLORIDE 0.9 %
500 INTRAVENOUS SOLUTION INTRAVENOUS ONCE
Status: COMPLETED | OUTPATIENT
Start: 2024-04-18 | End: 2024-04-18

## 2024-04-18 RX ADMIN — ASPIRIN 162 MG: 81 TABLET, CHEWABLE ORAL at 18:30

## 2024-04-18 RX ADMIN — SODIUM CHLORIDE 500 ML: 9 INJECTION, SOLUTION INTRAVENOUS at 16:55

## 2024-04-18 RX ADMIN — NITROGLYCERIN 0.5 INCH: 20 OINTMENT TOPICAL at 16:55

## 2024-04-18 ASSESSMENT — PAIN DESCRIPTION - ORIENTATION
ORIENTATION: LEFT

## 2024-04-18 ASSESSMENT — ENCOUNTER SYMPTOMS
SORE THROAT: 0
SHORTNESS OF BREATH: 0
NAUSEA: 0
ABDOMINAL PAIN: 0
VOMITING: 0
DIARRHEA: 0
COUGH: 0
BACK PAIN: 0

## 2024-04-18 ASSESSMENT — PAIN - FUNCTIONAL ASSESSMENT
PAIN_FUNCTIONAL_ASSESSMENT: 0-10
PAIN_FUNCTIONAL_ASSESSMENT: 0-10
PAIN_FUNCTIONAL_ASSESSMENT: PREVENTS OR INTERFERES SOME ACTIVE ACTIVITIES AND ADLS
PAIN_FUNCTIONAL_ASSESSMENT: 0-10

## 2024-04-18 ASSESSMENT — PAIN DESCRIPTION - LOCATION
LOCATION: CHEST

## 2024-04-18 ASSESSMENT — PAIN DESCRIPTION - DESCRIPTORS
DESCRIPTORS: SHARP;PRESSURE
DESCRIPTORS: ACHING
DESCRIPTORS: ACHING
DESCRIPTORS: BURNING;SHARP

## 2024-04-18 ASSESSMENT — PAIN DESCRIPTION - PAIN TYPE
TYPE: ACUTE PAIN

## 2024-04-18 ASSESSMENT — PAIN SCALES - GENERAL
PAINLEVEL_OUTOF10: 10

## 2024-04-18 ASSESSMENT — PAIN DESCRIPTION - FREQUENCY
FREQUENCY: CONTINUOUS

## 2024-04-18 ASSESSMENT — HEART SCORE: ECG: NORMAL

## 2024-04-18 NOTE — ED NOTES
[place] [employer] is called and they are aware pt will be d/c'd soon and the dispatcher states, \"I'm sending an officer, right now.\"

## 2024-04-18 NOTE — ED NOTES
Pt is given d/c instructions, no scripts.  Pt voiced understanding of d/c instructions without further questions.

## 2024-04-18 NOTE — ED TRIAGE NOTES
Pt arrives to ED, from home, via police escort.  Pt states he was exercising by riding his bicycle and the [employer] approached him for an outstanding warrant.  Pt states he began to have chest pain.

## 2024-04-18 NOTE — DISCHARGE INSTRUCTIONS
Please follow-up with your cardiologist.  Return to emergency department for any new or worsening symptoms.

## 2024-04-19 LAB
EKG ATRIAL RATE: 82 BPM
EKG P AXIS: 37 DEGREES
EKG P-R INTERVAL: 164 MS
EKG Q-T INTERVAL: 348 MS
EKG QRS DURATION: 78 MS
EKG QTC CALCULATION (BAZETT): 406 MS
EKG R AXIS: -21 DEGREES
EKG T AXIS: -22 DEGREES
EKG VENTRICULAR RATE: 82 BPM

## 2024-04-19 PROCEDURE — 93010 ELECTROCARDIOGRAM REPORT: CPT | Performed by: INTERNAL MEDICINE

## 2024-07-20 ENCOUNTER — APPOINTMENT (OUTPATIENT)
Dept: RADIOLOGY | Facility: HOSPITAL | Age: 64
End: 2024-07-20
Payer: COMMERCIAL

## 2024-07-20 ENCOUNTER — CLINICAL SUPPORT (OUTPATIENT)
Dept: EMERGENCY MEDICINE | Facility: HOSPITAL | Age: 64
End: 2024-07-20
Payer: COMMERCIAL

## 2024-07-20 ENCOUNTER — HOSPITAL ENCOUNTER (EMERGENCY)
Facility: HOSPITAL | Age: 64
Discharge: HOME | End: 2024-07-20
Payer: COMMERCIAL

## 2024-07-20 ENCOUNTER — HOSPITAL ENCOUNTER (EMERGENCY)
Facility: HOSPITAL | Age: 64
Discharge: HOME | End: 2024-07-20
Attending: STUDENT IN AN ORGANIZED HEALTH CARE EDUCATION/TRAINING PROGRAM
Payer: COMMERCIAL

## 2024-07-20 VITALS
BODY MASS INDEX: 33.34 KG/M2 | RESPIRATION RATE: 25 BRPM | TEMPERATURE: 98.2 F | DIASTOLIC BLOOD PRESSURE: 83 MMHG | SYSTOLIC BLOOD PRESSURE: 121 MMHG | HEART RATE: 65 BPM | HEIGHT: 68 IN | WEIGHT: 220 LBS | OXYGEN SATURATION: 98 %

## 2024-07-20 DIAGNOSIS — F14.10 COCAINE ABUSE (MULTI): ICD-10-CM

## 2024-07-20 DIAGNOSIS — R07.9 CHEST PAIN, UNSPECIFIED TYPE: Primary | ICD-10-CM

## 2024-07-20 LAB
ALBUMIN SERPL BCP-MCNC: 4 G/DL (ref 3.4–5)
ALP SERPL-CCNC: 85 U/L (ref 33–136)
ALT SERPL W P-5'-P-CCNC: 17 U/L (ref 10–52)
ANION GAP SERPL CALC-SCNC: 13 MMOL/L (ref 10–20)
AST SERPL W P-5'-P-CCNC: 18 U/L (ref 9–39)
ATRIAL RATE: 60 BPM
ATRIAL RATE: 61 BPM
BASOPHILS # BLD AUTO: 0.04 X10*3/UL (ref 0–0.1)
BASOPHILS NFR BLD AUTO: 0.7 %
BILIRUB SERPL-MCNC: 0.8 MG/DL (ref 0–1.2)
BNP SERPL-MCNC: 62 PG/ML (ref 0–99)
BUN SERPL-MCNC: 12 MG/DL (ref 6–23)
CALCIUM SERPL-MCNC: 9.5 MG/DL (ref 8.6–10.6)
CARDIAC TROPONIN I PNL SERPL HS: 24 NG/L (ref 0–53)
CARDIAC TROPONIN I PNL SERPL HS: 25 NG/L (ref 0–53)
CHLORIDE SERPL-SCNC: 100 MMOL/L (ref 98–107)
CO2 SERPL-SCNC: 28 MMOL/L (ref 21–32)
CREAT SERPL-MCNC: 0.91 MG/DL (ref 0.5–1.3)
EGFRCR SERPLBLD CKD-EPI 2021: >90 ML/MIN/1.73M*2
EOSINOPHIL # BLD AUTO: 0.03 X10*3/UL (ref 0–0.7)
EOSINOPHIL NFR BLD AUTO: 0.5 %
ERYTHROCYTE [DISTWIDTH] IN BLOOD BY AUTOMATED COUNT: 12.6 % (ref 11.5–14.5)
GLUCOSE SERPL-MCNC: 131 MG/DL (ref 74–99)
HCT VFR BLD AUTO: 43.1 % (ref 41–52)
HGB BLD-MCNC: 14.8 G/DL (ref 13.5–17.5)
IMM GRANULOCYTES # BLD AUTO: 0.02 X10*3/UL (ref 0–0.7)
IMM GRANULOCYTES NFR BLD AUTO: 0.3 % (ref 0–0.9)
LYMPHOCYTES # BLD AUTO: 1.32 X10*3/UL (ref 1.2–4.8)
LYMPHOCYTES NFR BLD AUTO: 23 %
MAGNESIUM SERPL-MCNC: 1.82 MG/DL (ref 1.6–2.4)
MCH RBC QN AUTO: 29.2 PG (ref 26–34)
MCHC RBC AUTO-ENTMCNC: 34.3 G/DL (ref 32–36)
MCV RBC AUTO: 85 FL (ref 80–100)
MONOCYTES # BLD AUTO: 0.59 X10*3/UL (ref 0.1–1)
MONOCYTES NFR BLD AUTO: 10.3 %
NEUTROPHILS # BLD AUTO: 3.75 X10*3/UL (ref 1.2–7.7)
NEUTROPHILS NFR BLD AUTO: 65.2 %
NRBC BLD-RTO: 0 /100 WBCS (ref 0–0)
P AXIS: 66 DEGREES
P AXIS: 77 DEGREES
P OFFSET: 206 MS
P OFFSET: 207 MS
P ONSET: 146 MS
P ONSET: 146 MS
PLATELET # BLD AUTO: 229 X10*3/UL (ref 150–450)
POTASSIUM SERPL-SCNC: 3.7 MMOL/L (ref 3.5–5.3)
PR INTERVAL: 154 MS
PR INTERVAL: 154 MS
PROT SERPL-MCNC: 7.2 G/DL (ref 6.4–8.2)
Q ONSET: 223 MS
Q ONSET: 223 MS
QRS COUNT: 10 BEATS
QRS COUNT: 9 BEATS
QRS DURATION: 78 MS
QRS DURATION: 78 MS
QT INTERVAL: 380 MS
QT INTERVAL: 416 MS
QTC CALCULATION(BAZETT): 382 MS
QTC CALCULATION(BAZETT): 416 MS
QTC FREDERICIA: 381 MS
QTC FREDERICIA: 416 MS
R AXIS: -7 DEGREES
R AXIS: 44 DEGREES
RBC # BLD AUTO: 5.07 X10*6/UL (ref 4.5–5.9)
SODIUM SERPL-SCNC: 137 MMOL/L (ref 136–145)
T AXIS: -31 DEGREES
T AXIS: -53 DEGREES
T OFFSET: 413 MS
T OFFSET: 431 MS
VENTRICULAR RATE: 60 BPM
VENTRICULAR RATE: 61 BPM
WBC # BLD AUTO: 5.8 X10*3/UL (ref 4.4–11.3)

## 2024-07-20 PROCEDURE — 85025 COMPLETE CBC W/AUTO DIFF WBC: CPT | Performed by: NURSE PRACTITIONER

## 2024-07-20 PROCEDURE — 93005 ELECTROCARDIOGRAM TRACING: CPT

## 2024-07-20 PROCEDURE — 80053 COMPREHEN METABOLIC PANEL: CPT | Performed by: NURSE PRACTITIONER

## 2024-07-20 PROCEDURE — 83735 ASSAY OF MAGNESIUM: CPT | Performed by: NURSE PRACTITIONER

## 2024-07-20 PROCEDURE — 84484 ASSAY OF TROPONIN QUANT: CPT | Performed by: NURSE PRACTITIONER

## 2024-07-20 PROCEDURE — 4500999001 HC ED NO CHARGE

## 2024-07-20 PROCEDURE — 36415 COLL VENOUS BLD VENIPUNCTURE: CPT | Performed by: NURSE PRACTITIONER

## 2024-07-20 PROCEDURE — 71046 X-RAY EXAM CHEST 2 VIEWS: CPT

## 2024-07-20 PROCEDURE — 99283 EMERGENCY DEPT VISIT LOW MDM: CPT | Mod: 25

## 2024-07-20 PROCEDURE — 71046 X-RAY EXAM CHEST 2 VIEWS: CPT | Performed by: RADIOLOGY

## 2024-07-20 PROCEDURE — 2500000001 HC RX 250 WO HCPCS SELF ADMINISTERED DRUGS (ALT 637 FOR MEDICARE OP): Mod: SE | Performed by: NURSE PRACTITIONER

## 2024-07-20 PROCEDURE — 83880 ASSAY OF NATRIURETIC PEPTIDE: CPT | Performed by: STUDENT IN AN ORGANIZED HEALTH CARE EDUCATION/TRAINING PROGRAM

## 2024-07-20 RX ORDER — NAPROXEN SODIUM 220 MG/1
324 TABLET, FILM COATED ORAL ONCE
Status: COMPLETED | OUTPATIENT
Start: 2024-07-20 | End: 2024-07-20

## 2024-07-20 RX ADMIN — ASPIRIN 81 MG CHEWABLE TABLET 324 MG: 81 TABLET CHEWABLE at 08:05

## 2024-07-20 ASSESSMENT — PAIN SCALES - GENERAL
PAINLEVEL_OUTOF10: 8
PAINLEVEL_OUTOF10: 8
PAINLEVEL_OUTOF10: 9

## 2024-07-20 ASSESSMENT — COLUMBIA-SUICIDE SEVERITY RATING SCALE - C-SSRS
1. IN THE PAST MONTH, HAVE YOU WISHED YOU WERE DEAD OR WISHED YOU COULD GO TO SLEEP AND NOT WAKE UP?: NO
6. HAVE YOU EVER DONE ANYTHING, STARTED TO DO ANYTHING, OR PREPARED TO DO ANYTHING TO END YOUR LIFE?: NO
2. HAVE YOU ACTUALLY HAD ANY THOUGHTS OF KILLING YOURSELF?: NO

## 2024-07-20 ASSESSMENT — PAIN DESCRIPTION - DESCRIPTORS: DESCRIPTORS: ACHING

## 2024-07-20 ASSESSMENT — HEART SCORE
ECG: NORMAL
TROPONIN: LESS THAN OR EQUAL TO NORMAL LIMIT
HISTORY: SLIGHTLY SUSPICIOUS
HEART SCORE: 3
RISK FACTORS: >2 RISK FACTORS OR HX OF ATHEROSCLEROTIC DISEASE
AGE: 45-64

## 2024-07-20 ASSESSMENT — LIFESTYLE VARIABLES
HAVE YOU EVER FELT YOU SHOULD CUT DOWN ON YOUR DRINKING: NO
TOTAL SCORE: 0
EVER FELT BAD OR GUILTY ABOUT YOUR DRINKING: NO
EVER HAD A DRINK FIRST THING IN THE MORNING TO STEADY YOUR NERVES TO GET RID OF A HANGOVER: NO
HAVE PEOPLE ANNOYED YOU BY CRITICIZING YOUR DRINKING: NO

## 2024-07-20 ASSESSMENT — PAIN - FUNCTIONAL ASSESSMENT: PAIN_FUNCTIONAL_ASSESSMENT: 0-10

## 2024-07-20 NOTE — ED TRIAGE NOTES
PHOEBE CHU is a 63y old M with a PMHx significant of HTN, lumbar spinal stenosis, hydrocelectomy April 2023, current active smoker and drinks daily who presents to [facility] ED are with chief complaint of L sided non-radiating chest pain that started approximately 4 hours ago. Of note: Pt has history of polysubstance abuse. Preferably snorting cocaine. Last use was Wednesday. Pt also states he has associated SOB. Family history (Father) of MI in 1963. No change to bowel or bladder patterns. No dizziness, syncopal or diaphoretic episodes. Allergies listed in EMR

## 2024-07-20 NOTE — ED PROVIDER NOTES
HPI   Chief Complaint   Patient presents with    Chest Pain         History provided by:  Patient   used: No      63-year-old male presents for evaluation of left-sided chest pain that radiates to his left arm onset last evening.  Patient states that he has been going through a lot as he is going through a divorce, got kicked out of his house and is homeless and relapsed on using cocaine yesterday.  He endorses cigarette smoking in addition but denies any excessive alcohol use.  Denies any homicidal or suicidal ideation.  Denies any trauma, fall, injury or anticoagulation use.  Denies any long periods of immobility or history of blood clots.  States that he has cardiac issues and has been seen in the past for these.  Denies any additional symptoms or complaints this time.    ROS is otherwise negative unless stated above.    Patient History   Past Medical History:   Diagnosis Date    Personal history of other diseases of the digestive system     History of hiatal hernia     Past Surgical History:   Procedure Laterality Date    OTHER SURGICAL HISTORY  11/21/2022    Colonoscopy complete for polypectomy    OTHER SURGICAL HISTORY  11/21/2022    Hydrocele repair    OTHER SURGICAL HISTORY  11/21/2022    Lumbar laminectomy    OTHER SURGICAL HISTORY  11/21/2022    Wrist surgery    OTHER SURGICAL HISTORY  11/21/2022    Esophagogastroduodenoscopy     Family History   Problem Relation Name Age of Onset    Breast cancer Mother      Cancer Father      Breast cancer Sister       Social History     Tobacco Use    Smoking status: Every Day     Current packs/day: 0.50     Average packs/day: 0.5 packs/day for 15.0 years (7.5 ttl pk-yrs)     Types: Cigarettes    Smokeless tobacco: Never   Vaping Use    Vaping status: Never Used   Substance Use Topics    Alcohol use: Not Currently    Drug use: Never       Physical Exam   ED Triage Vitals [07/20/24 0552]   Temperature Heart Rate Respirations BP   36.8 °C (98.2 °F) 74  18 127/82      Pulse Ox Temp Source Heart Rate Source Patient Position   97 % Oral Monitor --      BP Location FiO2 (%)     Left arm --       Physical Exam    VS: As documented in the triage note and EMR flowsheet from this visit were reviewed.    GEN: NAD, nontoxic, well-appearing, ambulates without difficulty  EYES:  EOMs grossly intact, anicteric sclera, no nystagmus noted, clear and equal bilaterally, no foreign body noted  HEENT: Airway patent, ears with clear tympanic membranes bilaterally. Nasal mucosa clear. Mouth with normal mucosa.  No area of abscess or fluctuance noted.  No drainage noted. Throat has no vesicles, no oropharyngeal exudates and uvula is midline. Face with no lymph node enlargement. No trismus or drooling noted.  Handling secretions.  Speech clear.  CARD: RRR, nontender chest, no crepitus deformities, no JVD, no murmurs rubs or gallops ; No edema noted.  Positive pulses bilaterally throughout.  Capillary refill less than 3 seconds.  No abnormal redness, warmth, tenderness or swelling noted to bilateral lower extremities.  PULMONARY: Clear all lung fields. Moving air well, Nonlabored, no accessory muscle use, able to speak complete sentences  ABDOMEN: Abdomen soft, non-distended, no rebound, no guarding. Bowel sounds normal in all 4 quadrants. No tenderness to palpation.  No CVA tenderness.  No masses or organomegaly noted.  No evidence of peritonitis. Negative Dixon's and McBurney's point tenderness.    : deferred  MUSK: Spine appears normal, range of motion is not limited, no muscle or joint tenderness. Strength 5 out of 5 equal bilaterally throughout.  No step-offs, deformities or additional signs of trauma noted.  No spinal/midline tenderness to palpation  SKIN: Skin normal color for race, warm, dry and intact. No evidence of trauma. No rash noted.  NEURO: Alert and oriented x 3, speech is clear, no obvious deficits noted. No facial droop noted.  PSYCH: Alert and oriented to person,  place, time/situation. normal mood and affect. No apparent risk to self or others. Thoughts are linear.  Does not appear decompensated.  Does not appear internally stimulated.  LYMPH: No adenopathy or splenomegaly. No cervical, supraclavicular or inguinal lymphadenopathy.      ED Course & MDM   Diagnoses as of 07/20/24 1201   Chest pain, unspecified type   Cocaine abuse (Multi)                       [place] Coma Scale Score: 15                        Medical Decision Making    Upon assessment patient is a healthy nontoxic-appearing male in no apparent distress.  He is ambulating independently in the ED without difficulty or dyspnea.  He is placed on continuous cardiac monitor and pulse ox today.  He is neurologically intact without any focal neurodeficits, chest pain is not reproducible to palpation but his assessment is otherwise reassuring and benign.  He has no overt signs of PE/DVT or fluid overload.  He states that he was having chest pain in the lobby but it has resolved now that he has spent a couple of hours at there and is in ED room.  He states he is now hungry and requesting food.  Plan is to obtain imaging and laboratory studies.  He is given baby aspirin for his symptoms.  He does not appear decompensated from a psychiatric standpoint and there is no indication for EPAT workup.    Workup was reviewed and unremarkable.  Heart score 3.  Per chart review patient had an unremarkable heart cath and echocardiogram last year therefore we felt reassured that patient does not need to be admitted for a new one at this time.  Upon reevaluation he states he feels improved and reassured.  He states that he is happy that he has been fed.  He remained hemodynamically stable throughout my ED course of care.  Findings and plan were discussed with patient is agreeable for plan to discharge home primary provider follow-up next couple days for further outpatient management and reevaluation.  All question concerns were  addressed.  Additionally patient states that he called a rehab and is going to go there for his cocaine abuse.  He is educated maintain proper rest, hydration and on stress relief techniques.         EKG Interpretation: EKG which showed normal sinus rhythm at a rate of 60, , QRS 78, QTc 460 without evidence of STEMI.  Does have noted EKG changes of inverted T waves in leads II, 3, aVF, V5, V6 with ST depression noted. changed from prior on 6/6/2023 -this was reviewed in the system as I could not find the physical copy and I did obtain a repeat which showed none of these changes and showed normal sinus rhythm at a rate of 61, , QRS 78, QTc 382 without evidence of STEMI or ischemia.  It was the same from priors.    Differential Diagnoses Considered: ACS, drug abuse, electrolyte abnormality, anxiety, malingering, homelessness    Chronic Medical Conditions Significantly Affecting Care: Cocaine use    External Records Reviewed: I reviewed recent and relevant outside records including: PCP notes, prior discharge summary, previous radiologic studies, HIE    Independent Interpretation of Studies:  I independently interpreted: Chest x-ray which revealed no acute cardiopulmonary process    Escalation of Care:  Appropriate for outpatient management with primary care provider about the next couple days.  Return precautions discussed and patient verbalized understanding. All questions and concerns were addressed.    Social Determinants of Health Significantly Affecting Care:  Homelessness, lacking transportation    Prescription Drug Consideration: None    Diagnostic testing considered: Considered workup for CT PE but there is no indication for excessive radiation, he has no clinical signs of DVT/PE and otherwise has a reassuring assessment hemodynamics.  He does admit to cocaine use and this is likely the cause of his chest pain.    Discussion of Management with Other Providers:   I discussed the patient/results  with: None      This patient was staffed with ED Attending Dr. Pereira to review the plan of care during ED course.    *Please note that portions of this note may have been completed with a voice recognition program.  Efforts were made to edit the dictations but occasionally, words are mis-transcribed.      Procedure  Procedures     Linda Mukherjee, PATRICIA-CNP  07/20/24 1202